# Patient Record
Sex: FEMALE | Race: OTHER | HISPANIC OR LATINO | URBAN - METROPOLITAN AREA
[De-identification: names, ages, dates, MRNs, and addresses within clinical notes are randomized per-mention and may not be internally consistent; named-entity substitution may affect disease eponyms.]

---

## 2017-08-20 ENCOUNTER — EMERGENCY (EMERGENCY)
Facility: HOSPITAL | Age: 27
LOS: 1 days | Discharge: HOME | End: 2017-08-20
Attending: EMERGENCY MEDICINE | Admitting: EMERGENCY MEDICINE
Payer: SELF-PAY

## 2017-08-20 VITALS
SYSTOLIC BLOOD PRESSURE: 105 MMHG | DIASTOLIC BLOOD PRESSURE: 67 MMHG | TEMPERATURE: 98 F | RESPIRATION RATE: 18 BRPM | OXYGEN SATURATION: 100 % | HEART RATE: 78 BPM

## 2017-08-20 VITALS
TEMPERATURE: 99 F | DIASTOLIC BLOOD PRESSURE: 54 MMHG | SYSTOLIC BLOOD PRESSURE: 95 MMHG | OXYGEN SATURATION: 100 % | HEART RATE: 82 BPM | RESPIRATION RATE: 18 BRPM | WEIGHT: 141.32 LBS

## 2017-08-20 DIAGNOSIS — R10.13 EPIGASTRIC PAIN: ICD-10-CM

## 2017-08-20 DIAGNOSIS — Z79.899 OTHER LONG TERM (CURRENT) DRUG THERAPY: ICD-10-CM

## 2017-08-20 DIAGNOSIS — J45.909 UNSPECIFIED ASTHMA, UNCOMPLICATED: ICD-10-CM

## 2017-08-20 LAB
ALBUMIN SERPL ELPH-MCNC: 3.9 G/DL — SIGNIFICANT CHANGE UP (ref 3.3–5)
ALP SERPL-CCNC: 38 U/L — LOW (ref 40–120)
ALT FLD-CCNC: 7 U/L — LOW (ref 10–45)
ANION GAP SERPL CALC-SCNC: 10 MMOL/L — SIGNIFICANT CHANGE UP (ref 5–17)
APPEARANCE UR: CLEAR — SIGNIFICANT CHANGE UP
AST SERPL-CCNC: 11 U/L — SIGNIFICANT CHANGE UP (ref 10–40)
BASOPHILS NFR BLD AUTO: 0.3 % — SIGNIFICANT CHANGE UP (ref 0–2)
BILIRUB SERPL-MCNC: 0.5 MG/DL — SIGNIFICANT CHANGE UP (ref 0.2–1.2)
BILIRUB UR-MCNC: NEGATIVE — SIGNIFICANT CHANGE UP
BUN SERPL-MCNC: 9 MG/DL — SIGNIFICANT CHANGE UP (ref 7–23)
CALCIUM SERPL-MCNC: 9.2 MG/DL — SIGNIFICANT CHANGE UP (ref 8.4–10.5)
CHLORIDE SERPL-SCNC: 105 MMOL/L — SIGNIFICANT CHANGE UP (ref 96–108)
CO2 SERPL-SCNC: 25 MMOL/L — SIGNIFICANT CHANGE UP (ref 22–31)
COLOR SPEC: YELLOW — SIGNIFICANT CHANGE UP
CREAT SERPL-MCNC: 0.7 MG/DL — SIGNIFICANT CHANGE UP (ref 0.5–1.3)
DIFF PNL FLD: NEGATIVE — SIGNIFICANT CHANGE UP
EOSINOPHIL NFR BLD AUTO: 0.3 % — SIGNIFICANT CHANGE UP (ref 0–6)
GLUCOSE SERPL-MCNC: 108 MG/DL — HIGH (ref 70–99)
GLUCOSE UR QL: NEGATIVE — SIGNIFICANT CHANGE UP
HCG UR QL: NEGATIVE — SIGNIFICANT CHANGE UP
HCT VFR BLD CALC: 37.2 % — SIGNIFICANT CHANGE UP (ref 34.5–45)
HGB BLD-MCNC: 12.3 G/DL — SIGNIFICANT CHANGE UP (ref 11.5–15.5)
KETONES UR-MCNC: NEGATIVE — SIGNIFICANT CHANGE UP
LEUKOCYTE ESTERASE UR-ACNC: NEGATIVE — SIGNIFICANT CHANGE UP
LIDOCAIN IGE QN: 15 U/L — SIGNIFICANT CHANGE UP (ref 7–60)
LYMPHOCYTES # BLD AUTO: 38 % — SIGNIFICANT CHANGE UP (ref 13–44)
MCHC RBC-ENTMCNC: 28.6 PG — SIGNIFICANT CHANGE UP (ref 27–34)
MCHC RBC-ENTMCNC: 33.1 G/DL — SIGNIFICANT CHANGE UP (ref 32–36)
MCV RBC AUTO: 86.5 FL — SIGNIFICANT CHANGE UP (ref 80–100)
MONOCYTES NFR BLD AUTO: 4.6 % — SIGNIFICANT CHANGE UP (ref 2–14)
NEUTROPHILS NFR BLD AUTO: 56.8 % — SIGNIFICANT CHANGE UP (ref 43–77)
NITRITE UR-MCNC: NEGATIVE — SIGNIFICANT CHANGE UP
PH UR: 5.5 — SIGNIFICANT CHANGE UP (ref 5–8)
PLATELET # BLD AUTO: 202 K/UL — SIGNIFICANT CHANGE UP (ref 150–400)
POTASSIUM SERPL-MCNC: 3.6 MMOL/L — SIGNIFICANT CHANGE UP (ref 3.5–5.3)
POTASSIUM SERPL-SCNC: 3.6 MMOL/L — SIGNIFICANT CHANGE UP (ref 3.5–5.3)
PROT SERPL-MCNC: 6.9 G/DL — SIGNIFICANT CHANGE UP (ref 6–8.3)
PROT UR-MCNC: NEGATIVE MG/DL — SIGNIFICANT CHANGE UP
RBC # BLD: 4.3 M/UL — SIGNIFICANT CHANGE UP (ref 3.8–5.2)
RBC # FLD: 13 % — SIGNIFICANT CHANGE UP (ref 10.3–16.9)
SODIUM SERPL-SCNC: 140 MMOL/L — SIGNIFICANT CHANGE UP (ref 135–145)
SP GR SPEC: >=1.03 — SIGNIFICANT CHANGE UP (ref 1–1.03)
UROBILINOGEN FLD QL: 0.2 E.U./DL — SIGNIFICANT CHANGE UP
WBC # BLD: 7 K/UL — SIGNIFICANT CHANGE UP (ref 3.8–10.5)
WBC # FLD AUTO: 7 K/UL — SIGNIFICANT CHANGE UP (ref 3.8–10.5)

## 2017-08-20 PROCEDURE — 93010 ELECTROCARDIOGRAM REPORT: CPT

## 2017-08-20 PROCEDURE — 96374 THER/PROPH/DIAG INJ IV PUSH: CPT

## 2017-08-20 PROCEDURE — 36415 COLL VENOUS BLD VENIPUNCTURE: CPT

## 2017-08-20 PROCEDURE — 83690 ASSAY OF LIPASE: CPT

## 2017-08-20 PROCEDURE — 81025 URINE PREGNANCY TEST: CPT

## 2017-08-20 PROCEDURE — 81003 URINALYSIS AUTO W/O SCOPE: CPT

## 2017-08-20 PROCEDURE — 76705 ECHO EXAM OF ABDOMEN: CPT

## 2017-08-20 PROCEDURE — 93005 ELECTROCARDIOGRAM TRACING: CPT

## 2017-08-20 PROCEDURE — 99285 EMERGENCY DEPT VISIT HI MDM: CPT | Mod: 25

## 2017-08-20 PROCEDURE — 85025 COMPLETE CBC W/AUTO DIFF WBC: CPT

## 2017-08-20 PROCEDURE — 76705 ECHO EXAM OF ABDOMEN: CPT | Mod: 26

## 2017-08-20 PROCEDURE — 99284 EMERGENCY DEPT VISIT MOD MDM: CPT | Mod: 25

## 2017-08-20 PROCEDURE — 80053 COMPREHEN METABOLIC PANEL: CPT

## 2017-08-20 RX ORDER — SODIUM CHLORIDE 9 MG/ML
1000 INJECTION INTRAMUSCULAR; INTRAVENOUS; SUBCUTANEOUS ONCE
Qty: 0 | Refills: 0 | Status: COMPLETED | OUTPATIENT
Start: 2017-08-20 | End: 2017-08-20

## 2017-08-20 RX ORDER — FAMOTIDINE 10 MG/ML
20 INJECTION INTRAVENOUS ONCE
Qty: 0 | Refills: 0 | Status: COMPLETED | OUTPATIENT
Start: 2017-08-20 | End: 2017-08-20

## 2017-08-20 RX ORDER — FAMOTIDINE 10 MG/ML
1 INJECTION INTRAVENOUS
Qty: 30 | Refills: 0 | OUTPATIENT
Start: 2017-08-20 | End: 2017-09-19

## 2017-08-20 RX ORDER — SODIUM CHLORIDE 9 MG/ML
3 INJECTION INTRAMUSCULAR; INTRAVENOUS; SUBCUTANEOUS ONCE
Qty: 0 | Refills: 0 | Status: COMPLETED | OUTPATIENT
Start: 2017-08-20 | End: 2017-08-20

## 2017-08-20 RX ORDER — LIDOCAINE 4 G/100G
10 CREAM TOPICAL ONCE
Qty: 0 | Refills: 0 | Status: COMPLETED | OUTPATIENT
Start: 2017-08-20 | End: 2017-08-20

## 2017-08-20 RX ADMIN — SODIUM CHLORIDE 1000 MILLILITER(S): 9 INJECTION INTRAMUSCULAR; INTRAVENOUS; SUBCUTANEOUS at 17:09

## 2017-08-20 RX ADMIN — LIDOCAINE 10 MILLILITER(S): 4 CREAM TOPICAL at 17:09

## 2017-08-20 RX ADMIN — Medication 30 MILLILITER(S): at 17:09

## 2017-08-20 RX ADMIN — FAMOTIDINE 20 MILLIGRAM(S): 10 INJECTION INTRAVENOUS at 17:09

## 2017-08-20 RX ADMIN — SODIUM CHLORIDE 3 MILLILITER(S): 9 INJECTION INTRAMUSCULAR; INTRAVENOUS; SUBCUTANEOUS at 17:06

## 2017-08-20 NOTE — ED PROVIDER NOTE - OBJECTIVE STATEMENT
here with abdominal pain for past week.  Epigastric, non radiating, dull.  Has not tried anything for symptoms.  Never experienced before.  Not associated with eating.  Denies n/v/d, fever/chills, urinary symptoms.  LMP 1 week ago

## 2017-08-20 NOTE — ED ADULT TRIAGE NOTE - CHIEF COMPLAINT QUOTE
upper abdominal pian for 1 week ; hard to take a deep breath; denies injury or other symptoms; no BCP

## 2017-08-20 NOTE — ED PROVIDER NOTE - MEDICAL DECISION MAKING DETAILS
epigastric pain.  suspect gastritis/possible ulcer.  lipase neg making pancreatitis unlikely.  lft wnl and ruq us wnl making biliary pathology unlikely.  no fever/ vomiting.  plan pepcid, outpatient f/u.  diet modification

## 2023-01-02 ENCOUNTER — EMERGENCY (EMERGENCY)
Facility: HOSPITAL | Age: 33
LOS: 0 days | Discharge: HOME | End: 2023-01-02
Attending: STUDENT IN AN ORGANIZED HEALTH CARE EDUCATION/TRAINING PROGRAM
Payer: MEDICAID

## 2023-01-02 VITALS
HEIGHT: 61 IN | RESPIRATION RATE: 18 BRPM | SYSTOLIC BLOOD PRESSURE: 104 MMHG | WEIGHT: 123.9 LBS | OXYGEN SATURATION: 99 % | DIASTOLIC BLOOD PRESSURE: 64 MMHG | TEMPERATURE: 99 F | HEART RATE: 86 BPM

## 2023-01-02 VITALS
SYSTOLIC BLOOD PRESSURE: 100 MMHG | HEART RATE: 80 BPM | TEMPERATURE: 98 F | OXYGEN SATURATION: 99 % | RESPIRATION RATE: 18 BRPM | DIASTOLIC BLOOD PRESSURE: 64 MMHG
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
## 2023-01-02 DIAGNOSIS — R07.89 OTHER CHEST PAIN: ICD-10-CM
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
## 2023-01-02 LAB
ALBUMIN SERPL ELPH-MCNC: 3.7 G/DL — SIGNIFICANT CHANGE UP (ref 3.3–5)
ALP SERPL-CCNC: 29 U/L — LOW (ref 40–120)
ALT FLD-CCNC: 17 U/L — SIGNIFICANT CHANGE UP (ref 12–78)
ANION GAP SERPL CALC-SCNC: 5 MMOL/L — SIGNIFICANT CHANGE UP (ref 5–17)
AST SERPL-CCNC: 10 U/L — LOW (ref 15–37)
BASOPHILS # BLD AUTO: 0.02 K/UL — SIGNIFICANT CHANGE UP (ref 0–0.2)
BASOPHILS NFR BLD AUTO: 0.2 % — SIGNIFICANT CHANGE UP (ref 0–2)
BILIRUB SERPL-MCNC: 0.4 MG/DL — SIGNIFICANT CHANGE UP (ref 0.2–1.2)
BUN SERPL-MCNC: 9 MG/DL — SIGNIFICANT CHANGE UP (ref 7–23)
CALCIUM SERPL-MCNC: 9.1 MG/DL — SIGNIFICANT CHANGE UP (ref 8.5–10.1)
CHLORIDE SERPL-SCNC: 108 MMOL/L — SIGNIFICANT CHANGE UP (ref 96–108)
CO2 SERPL-SCNC: 26 MMOL/L — SIGNIFICANT CHANGE UP (ref 22–31)
CREAT SERPL-MCNC: 0.71 MG/DL — SIGNIFICANT CHANGE UP (ref 0.5–1.3)
D DIMER BLD IA.RAPID-MCNC: 226 NG/ML DDU — SIGNIFICANT CHANGE UP
EGFR: 116 ML/MIN/1.73M2 — SIGNIFICANT CHANGE UP
EOSINOPHIL # BLD AUTO: 0 K/UL — SIGNIFICANT CHANGE UP (ref 0–0.5)
EOSINOPHIL NFR BLD AUTO: 0 % — SIGNIFICANT CHANGE UP (ref 0–6)
GLUCOSE SERPL-MCNC: 87 MG/DL — SIGNIFICANT CHANGE UP (ref 70–99)
HCG SERPL-ACNC: <1 MIU/ML — SIGNIFICANT CHANGE UP
HCT VFR BLD CALC: 39 % — SIGNIFICANT CHANGE UP (ref 34.5–45)
HGB BLD-MCNC: 12.7 G/DL — SIGNIFICANT CHANGE UP (ref 11.5–15.5)
IMM GRANULOCYTES NFR BLD AUTO: 0.3 % — SIGNIFICANT CHANGE UP (ref 0–0.9)
LYMPHOCYTES # BLD AUTO: 2.97 K/UL — SIGNIFICANT CHANGE UP (ref 1–3.3)
LYMPHOCYTES # BLD AUTO: 25.3 % — SIGNIFICANT CHANGE UP (ref 13–44)
MCHC RBC-ENTMCNC: 28.9 PG — SIGNIFICANT CHANGE UP (ref 27–34)
MCHC RBC-ENTMCNC: 32.6 G/DL — SIGNIFICANT CHANGE UP (ref 32–36)
MCV RBC AUTO: 88.8 FL — SIGNIFICANT CHANGE UP (ref 80–100)
MONOCYTES # BLD AUTO: 0.47 K/UL — SIGNIFICANT CHANGE UP (ref 0–0.9)
MONOCYTES NFR BLD AUTO: 4 % — SIGNIFICANT CHANGE UP (ref 2–14)
NEUTROPHILS # BLD AUTO: 8.25 K/UL — HIGH (ref 1.8–7.4)
NEUTROPHILS NFR BLD AUTO: 70.2 % — SIGNIFICANT CHANGE UP (ref 43–77)
NRBC # BLD: 0 /100 WBCS — SIGNIFICANT CHANGE UP (ref 0–0)
PLATELET # BLD AUTO: 236 K/UL — SIGNIFICANT CHANGE UP (ref 150–400)
POTASSIUM SERPL-MCNC: 3.8 MMOL/L — SIGNIFICANT CHANGE UP (ref 3.5–5.3)
POTASSIUM SERPL-SCNC: 3.8 MMOL/L — SIGNIFICANT CHANGE UP (ref 3.5–5.3)
PROT SERPL-MCNC: 6.8 GM/DL — SIGNIFICANT CHANGE UP (ref 6–8.3)
RBC # BLD: 4.39 M/UL — SIGNIFICANT CHANGE UP (ref 3.8–5.2)
RBC # FLD: 12.5 % — SIGNIFICANT CHANGE UP (ref 10.3–14.5)
SODIUM SERPL-SCNC: 139 MMOL/L — SIGNIFICANT CHANGE UP (ref 135–145)
TROPONIN I, HIGH SENSITIVITY RESULT: 11.7 NG/L — SIGNIFICANT CHANGE UP
WBC # BLD: 11.74 K/UL — HIGH (ref 3.8–10.5)
WBC # FLD AUTO: 11.74 K/UL — HIGH (ref 3.8–10.5)

## 2023-01-02 PROCEDURE — 99284 EMERGENCY DEPT VISIT MOD MDM: CPT

## 2023-01-02 PROCEDURE — 93010 ELECTROCARDIOGRAM REPORT: CPT

## 2023-01-02 PROCEDURE — 71046 X-RAY EXAM CHEST 2 VIEWS: CPT | Mod: 26

## 2023-01-02 RX ORDER — KETOROLAC TROMETHAMINE 30 MG/ML
30 SYRINGE (ML) INJECTION ONCE
Refills: 0 | Status: DISCONTINUED | OUTPATIENT
Start: 2023-01-02 | End: 2023-01-02

## 2023-01-02 RX ADMIN — Medication 30 MILLIGRAM(S): at 19:56

## 2023-01-02 NOTE — ED ADULT NURSE NOTE - OBJECTIVE STATEMENT
Pt AAOx4. 32 year old female presents to ED with L sided chest pain.  Pt reports having had similar symptoms on several occasions in the past, which had resolved, and had not seen doctor before for it.  Pt states when pain started today she was doing housework, cleaning, and lifting things when the pain started.  Pain worse with movement and inspiration.  Denies shortness of breath, abdominal pain, nausea/vomiting, fever, radiation of pain, diaphoresis. Pt thinks her birth control patch is causing pain. Respirations equal and unlabored. No acute distress noted at this time. Pt placed on cardiac monitor at bedside.

## 2023-01-02 NOTE — ED PROVIDER NOTE - PHYSICAL EXAMINATION
General appearance: Nontoxic appearing, conversant, afebrile    Eyes: anicteric sclerae, ISABEL, EOMI   HENT: Atraumatic; oropharynx clear, MMM and no ulcerations, no pharyngeal erythema or exudate   Neck: Trachea midline; Full range of motion, supple   Pulm: CTA bl, normal respiratory effort and no intercostal retractions, normal work of breathing   CV: RRR, No murmurs, rubs, or gallops, ttp L chest costochondral region   Abdomen: Soft, non-tender, non-distended; no guarding or rebound   Extremities: No peripheral edema, no gross deformities, FROM x4   Skin: Dry, normal temperature, turgor and texture; no rash   Psych: Appropriate affect, cooperative

## 2023-01-02 NOTE — ED PROVIDER NOTE - PATIENT PORTAL LINK FT
You can access the FollowMyHealth Patient Portal offered by [facility] by registering at the following website: [email or web address]. By joining [employer]’s FollowMyHealth portal, you will also be able to view your health information using other applications (apps) compatible with our system.

## 2023-01-02 NOTE — ED PROVIDER NOTE - NSFOLLOWUPINSTRUCTIONS_ED_ALL_ED_FT
Rest, drink plenty of fluids  Advance activity as tolerated  Continue all previously prescribed medications as directed  Follow up with your PMD - bring copies of your results  Return to the ER for severe chest pain, difficulty breathing, or other new or concerning symptoms

## 2023-01-02 NOTE — ED PROVIDER NOTE - OBJECTIVE STATEMENT
33yo female with no pmh presenting with L sided chest pain.  Has had similar on several occasions in the past which had self resolved and had not seen a doctor before for it.  Was doing house work and lifting things when pain started.  Pain worse with movement and inspiration.  Non exertional.  No abd pain, n/v, fever, cough, urinary symptoms.  No pshx.  Thought her ocps were causing it so she stopped taking.  No hx dvt/ pe.  Pain reproducible with palpation of L chest.

## 2023-01-02 NOTE — ED PROVIDER NOTE - CLINICAL SUMMARY MEDICAL DECISION MAKING FREE TEXT BOX
Presenting with L sided cp.  On and off over several weeks, acutely worse after lifting things today.  Not exertional - low suspicion acs.  Wells 0 and lower suspicion as not hypoxic, tachycardic, no edema or hx dvt/pe.  TTP over L chest wall costochondral joint with no rash, possible costochondritis.  No infectious symptoms.  No trauma.  Will XR, check labs, medicate, reassess.

## 2023-01-02 NOTE — ED PROVIDER NOTE - PROGRESS NOTE DETAILS
Pio: Symptomatically much improved.  Labs, XR, ecg, non actionable and reviewed results with patient.  Answered questions and will dc.

## 2023-05-09 NOTE — ED ADULT NURSE NOTE - PAIN: BODY LOCATION
upper/abdomen Protopic Pregnancy And Lactation Text: This medication is Pregnancy Category C. It is unknown if this medication is excreted in breast milk when applied topically.

## 2023-07-28 ENCOUNTER — EMERGENCY (EMERGENCY)
Facility: HOSPITAL | Age: 33
LOS: 0 days | Discharge: HOME | End: 2023-07-28
Attending: STUDENT IN AN ORGANIZED HEALTH CARE EDUCATION/TRAINING PROGRAM
Payer: MEDICAID

## 2023-07-28 VITALS
WEIGHT: 139.99 LBS | HEIGHT: 61 IN | OXYGEN SATURATION: 98 % | RESPIRATION RATE: 16 BRPM | HEART RATE: 90 BPM | SYSTOLIC BLOOD PRESSURE: 126 MMHG | TEMPERATURE: 100 F | DIASTOLIC BLOOD PRESSURE: 75 MMHG

## 2023-07-28 DIAGNOSIS — S61.011A LACERATION WITHOUT FOREIGN BODY OF RIGHT THUMB WITHOUT DAMAGE TO NAIL, INITIAL ENCOUNTER: ICD-10-CM
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
## 2023-07-28 PROCEDURE — 12001 RPR S/N/AX/GEN/TRNK 2.5CM/<: CPT

## 2023-07-28 PROCEDURE — 99283 EMERGENCY DEPT VISIT LOW MDM: CPT | Mod: 25

## 2023-07-28 NOTE — ED ADULT TRIAGE NOTE - BP NONINVASIVE DIASTOLIC (MM HG)
Procedure   Large Joint Arthrocentesis  Date/Time: 4/3/2018 3:16 PM  Consent given by: patient  Site marked: site marked  Timeout: Immediately prior to procedure a time out was called to verify the correct patient, procedure, equipment, support staff and site/side marked as required   Supporting Documentation  Indications: pain   Procedure Details  Location: knee - R knee  Preparation: Patient was prepped and draped in the usual sterile fashion  Needle size: 22 G  Approach: anterolateral  Medications administered: 3 mL bupivacaine 0.25 %; 3 mL lidocaine 1 %; 80 mg triamcinolone acetonide 40 MG/ML  Patient tolerance: patient tolerated the procedure well with no immediate complications            75
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
## 2023-07-28 NOTE — ED PROVIDER NOTE - CLINICAL SUMMARY MEDICAL DECISION MAKING FREE TEXT BOX
32 y/o F presenting to the ED w/ R thumb laceration.  Vitals stable.  Laceration is superficial. Patient does not want stitches. She would prefer to try dermabond.  Laceration repaired with dermabond, appears well closed.  Tetanus reportedly UTD.  Patient left prior to getting her discharge papers.

## 2023-07-28 NOTE — ED ADULT NURSE NOTE - AS PAIN REST
Airway patent, TM normal bilaterally, normal appearing mouth, nose, throat, neck supple with full range of motion, no cervical adenopathy. Moist mucous membrane.
2 (mild pain)

## 2023-07-28 NOTE — ED ADULT NURSE NOTE - NSFALLUNIVINTERV_ED_ALL_ED
Bed/Stretcher in lowest position, wheels locked, appropriate side rails in place/Call bell, personal items and telephone in reach/Instruct patient to call for assistance before getting out of bed/chair/stretcher/Non-slip footwear applied when patient is off stretcher/[place] to call system/Physically safe environment - no spills, clutter or unnecessary equipment/Purposeful proactive rounding/Room/bathroom lighting operational, light cord in reach

## 2023-07-28 NOTE — ED PROVIDER NOTE - PHYSICAL EXAMINATION
GENERAL: Awake, alert, NAD  HEENT: NC/AT, moist mucous membranes  LUNGS: CTAB, no wheezes or crackles   CARDIAC: RRR, no m/r/g  EXT: +2cm superficial laceration to ventral surface of R thumb, +neurovascularly intact  NEURO: A&Ox3. Moving all extremities.  SKIN: Warm and dry. No rash.  PSYCH: Normal affect.
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
## 2023-07-28 NOTE — ED PROVIDER NOTE - PATIENT PORTAL LINK FT
You can access the FollowMyHealth Patient Portal offered by [facility] by registering at the following website: [email or web address]. By joining [employer]’s FollowMyHealth portal, you will also be able to view your health information using other applications (apps) compatible with our system.

## 2023-07-28 NOTE — ED ADULT NURSE NOTE - OBJECTIVE STATEMENT
33 year old patient has laceration to R-thumb s/p cuff with a straight pairing knife.  approx 2cm long, deep.  up to date on tetanus, pt doesn't want to get it sticked, not bleeding at this time, no other complaints, "just want to go home", doctor maureen will continue to Dermabond the laceration at this time

## 2024-10-09 ENCOUNTER — EMERGENCY (EMERGENCY)
Facility: HOSPITAL | Age: 34
LOS: 0 days | Discharge: LEFT BEFORE DISCHARGE | End: 2024-10-09
Attending: STUDENT IN AN ORGANIZED HEALTH CARE EDUCATION/TRAINING PROGRAM
Payer: MEDICAID

## 2024-10-09 VITALS
HEIGHT: 61 IN | OXYGEN SATURATION: 99 % | SYSTOLIC BLOOD PRESSURE: 103 MMHG | TEMPERATURE: 98 F | WEIGHT: 154.98 LBS | RESPIRATION RATE: 18 BRPM | DIASTOLIC BLOOD PRESSURE: 72 MMHG | HEART RATE: 62 BPM

## 2024-10-09 VITALS
SYSTOLIC BLOOD PRESSURE: 120 MMHG | TEMPERATURE: 98 F | HEART RATE: 57 BPM | RESPIRATION RATE: 20 BRPM | OXYGEN SATURATION: 99 % | DIASTOLIC BLOOD PRESSURE: 82 MMHG

## 2024-10-09 DIAGNOSIS — R10.32 LEFT LOWER QUADRANT PAIN: ICD-10-CM

## 2024-10-09 DIAGNOSIS — R11.2 NAUSEA WITH VOMITING, UNSPECIFIED: ICD-10-CM

## 2024-10-09 DIAGNOSIS — R10.33 PERIUMBILICAL PAIN: ICD-10-CM

## 2024-10-09 LAB
ALBUMIN SERPL ELPH-MCNC: 3.9 G/DL — SIGNIFICANT CHANGE UP (ref 3.3–5)
ALP SERPL-CCNC: 55 U/L — SIGNIFICANT CHANGE UP (ref 40–120)
ALT FLD-CCNC: 23 U/L — SIGNIFICANT CHANGE UP (ref 12–78)
ANION GAP SERPL CALC-SCNC: 11 MMOL/L — SIGNIFICANT CHANGE UP (ref 5–17)
APPEARANCE UR: CLEAR — SIGNIFICANT CHANGE UP
AST SERPL-CCNC: 13 U/L — LOW (ref 15–37)
BACTERIA # UR AUTO: ABNORMAL /HPF
BASOPHILS # BLD AUTO: 0.02 K/UL — SIGNIFICANT CHANGE UP (ref 0–0.2)
BASOPHILS NFR BLD AUTO: 0.2 % — SIGNIFICANT CHANGE UP (ref 0–2)
BILIRUB SERPL-MCNC: 0.3 MG/DL — SIGNIFICANT CHANGE UP (ref 0.2–1.2)
BILIRUB UR-MCNC: NEGATIVE — SIGNIFICANT CHANGE UP
BUN SERPL-MCNC: 9 MG/DL — SIGNIFICANT CHANGE UP (ref 7–23)
CALCIUM SERPL-MCNC: 9.3 MG/DL — SIGNIFICANT CHANGE UP (ref 8.5–10.1)
CHLORIDE SERPL-SCNC: 106 MMOL/L — SIGNIFICANT CHANGE UP (ref 96–108)
CO2 SERPL-SCNC: 24 MMOL/L — SIGNIFICANT CHANGE UP (ref 22–31)
COLOR SPEC: YELLOW — SIGNIFICANT CHANGE UP
CREAT SERPL-MCNC: 0.62 MG/DL — SIGNIFICANT CHANGE UP (ref 0.5–1.3)
DIFF PNL FLD: NEGATIVE — SIGNIFICANT CHANGE UP
EGFR: 120 ML/MIN/1.73M2 — SIGNIFICANT CHANGE UP
EOSINOPHIL # BLD AUTO: 0.01 K/UL — SIGNIFICANT CHANGE UP (ref 0–0.5)
EOSINOPHIL NFR BLD AUTO: 0.1 % — SIGNIFICANT CHANGE UP (ref 0–6)
EPI CELLS # UR: PRESENT
GLUCOSE SERPL-MCNC: 91 MG/DL — SIGNIFICANT CHANGE UP (ref 70–99)
GLUCOSE UR QL: NEGATIVE MG/DL — SIGNIFICANT CHANGE UP
HCG SERPL-ACNC: <1 MIU/ML — SIGNIFICANT CHANGE UP
HCT VFR BLD CALC: 43.3 % — SIGNIFICANT CHANGE UP (ref 34.5–45)
HGB BLD-MCNC: 14.3 G/DL — SIGNIFICANT CHANGE UP (ref 11.5–15.5)
IMM GRANULOCYTES NFR BLD AUTO: 0.2 % — SIGNIFICANT CHANGE UP (ref 0–0.9)
KETONES UR-MCNC: ABNORMAL MG/DL
LEUKOCYTE ESTERASE UR-ACNC: NEGATIVE — SIGNIFICANT CHANGE UP
LIDOCAIN IGE QN: 13 U/L — SIGNIFICANT CHANGE UP (ref 13–75)
LYMPHOCYTES # BLD AUTO: 1.98 K/UL — SIGNIFICANT CHANGE UP (ref 1–3.3)
LYMPHOCYTES # BLD AUTO: 20.6 % — SIGNIFICANT CHANGE UP (ref 13–44)
MCHC RBC-ENTMCNC: 28.6 PG — SIGNIFICANT CHANGE UP (ref 27–34)
MCHC RBC-ENTMCNC: 33 G/DL — SIGNIFICANT CHANGE UP (ref 32–36)
MCV RBC AUTO: 86.6 FL — SIGNIFICANT CHANGE UP (ref 80–100)
MONOCYTES # BLD AUTO: 0.25 K/UL — SIGNIFICANT CHANGE UP (ref 0–0.9)
MONOCYTES NFR BLD AUTO: 2.6 % — SIGNIFICANT CHANGE UP (ref 2–14)
NEUTROPHILS # BLD AUTO: 7.33 K/UL — SIGNIFICANT CHANGE UP (ref 1.8–7.4)
NEUTROPHILS NFR BLD AUTO: 76.3 % — SIGNIFICANT CHANGE UP (ref 43–77)
NITRITE UR-MCNC: NEGATIVE — SIGNIFICANT CHANGE UP
NRBC # BLD: 0 /100 WBCS — SIGNIFICANT CHANGE UP (ref 0–0)
PH UR: 7 — SIGNIFICANT CHANGE UP (ref 5–8)
PLATELET # BLD AUTO: 298 K/UL — SIGNIFICANT CHANGE UP (ref 150–400)
POTASSIUM SERPL-MCNC: 3.8 MMOL/L — SIGNIFICANT CHANGE UP (ref 3.5–5.3)
POTASSIUM SERPL-SCNC: 3.8 MMOL/L — SIGNIFICANT CHANGE UP (ref 3.5–5.3)
PROT SERPL-MCNC: 7.9 GM/DL — SIGNIFICANT CHANGE UP (ref 6–8.3)
PROT UR-MCNC: 30 MG/DL
RBC # BLD: 5 M/UL — SIGNIFICANT CHANGE UP (ref 3.8–5.2)
RBC # FLD: 12.7 % — SIGNIFICANT CHANGE UP (ref 10.3–14.5)
RBC CASTS # UR COMP ASSIST: 0 /HPF — SIGNIFICANT CHANGE UP (ref 0–4)
SODIUM SERPL-SCNC: 141 MMOL/L — SIGNIFICANT CHANGE UP (ref 135–145)
SP GR SPEC: 1.03 — SIGNIFICANT CHANGE UP (ref 1–1.03)
UROBILINOGEN FLD QL: 1 MG/DL — SIGNIFICANT CHANGE UP (ref 0.2–1)
WBC # BLD: 9.61 K/UL — SIGNIFICANT CHANGE UP (ref 3.8–10.5)
WBC # FLD AUTO: 9.61 K/UL — SIGNIFICANT CHANGE UP (ref 3.8–10.5)
WBC UR QL: 2 /HPF — SIGNIFICANT CHANGE UP (ref 0–5)

## 2024-10-09 PROCEDURE — 74177 CT ABD & PELVIS W/CONTRAST: CPT | Mod: 26,MC

## 2024-10-09 PROCEDURE — 99285 EMERGENCY DEPT VISIT HI MDM: CPT

## 2024-10-09 RX ORDER — ONDANSETRON HCL/PF 4 MG/2 ML
4 VIAL (ML) INJECTION ONCE
Refills: 0 | Status: COMPLETED | OUTPATIENT
Start: 2024-10-09 | End: 2024-10-09

## 2024-10-09 RX ORDER — SODIUM CHLORIDE 0.9 % (FLUSH) 0.9 %
1000 SYRINGE (ML) INJECTION ONCE
Refills: 0 | Status: COMPLETED | OUTPATIENT
Start: 2024-10-09 | End: 2024-10-09

## 2024-10-09 RX ORDER — FAMOTIDINE 40 MG
20 TABLET ORAL ONCE
Refills: 0 | Status: COMPLETED | OUTPATIENT
Start: 2024-10-09 | End: 2024-10-09

## 2024-10-09 RX ADMIN — Medication 1000 MILLILITER(S): at 16:44

## 2024-10-09 RX ADMIN — Medication 20 MILLIGRAM(S): at 16:45

## 2024-10-09 RX ADMIN — Medication 4 MILLIGRAM(S): at 16:45

## 2024-10-09 NOTE — ED PROVIDER NOTE - CLINICAL SUMMARY MEDICAL DECISION MAKING FREE TEXT BOX
33 y/o F with no significant PMH presenting to the ED w/ N/V.   Vitals stable.  Patient is well appearing in NAD.  She has mild periumbilical and LLQ tenderness.  Plan for labs and CT AP w/ IV contrast  Pain control

## 2024-10-09 NOTE — ED PROVIDER NOTE - OBJECTIVE STATEMENT
35 y/o F with no significant PMH presenting to the ED w/ N/V. Patient was drinking tequila yesterday. States today she began to have multiple episodes of vomiting. She is endorsing some LLQ pain. She has no fever or chills. No diarrhea. No chest pain or dyspnea. Vomiting stopped when she arrived to the ED.

## 2024-10-09 NOTE — ED PROVIDER NOTE - CARE PROVIDER_API CALL
Gagan Lutz  Gastroenterology  [address], [address]  [place] [address]  Phone: [phone]  Fax: [phone]  Follow Up Time: 1-3 Days

## 2024-10-09 NOTE — ED PROVIDER NOTE - PATIENT PORTAL LINK FT
You can access the FollowMyHealth Patient Portal offered by [facility] by registering at the following website: [email or web address]. By joining [employer]’s FollowMyHealth portal, you will also be able to view your health information using other applications (apps) compatible with our system.

## 2024-10-09 NOTE — ED ADULT TRIAGE NOTE - CHIEF COMPLAINT QUOTE
biba from home c/o n/v dizziness this morning after drinking 3 shots of tequila last night denies daily/frequent alcohol use ew=[id number] per ems

## 2024-10-09 NOTE — ED ADULT TRIAGE NOTE - TEMPERATURE IN CELSIUS (DEGREES C)
Post-Op Assessment Note    CV Status:  Stable    Pain management: adequate       Mental Status:  Alert and awake   Hydration Status:  Euvolemic   PONV Controlled:  Controlled   Airway Patency:  Patent     Post Op Vitals Reviewed: Yes    No anethesia notable event occurred.    Staff: Anesthesiologist               BP   150/81   Temp   98.1   Pulse 61   Resp 12   SpO2 97     
36.7

## 2024-10-09 NOTE — ED PROVIDER NOTE - PROGRESS NOTE DETAILS
Pt was seen and treated by Dr. Shahid ct abd/pelvis is pending, Pt has normal wbc, electrolytes bun/cr, lipase and lft. Pt is alert and oriented x 3 tolerated water orally abd is soft nontender to palp at all quadrants. Pt is smiling appears very comfortable and feeling much better now. ct abd/pelvis official report still not back and pt needs to go home because her  and pt sts she has no more abd pain, Pt is explained the risk of leaving hospital without ct abd/pelvis reports and pt sts she will come back if pain reoccurred. radiologist Dr. Wen  just called back sts ct abd/pelvis showed salpingitis  and pt needs to come back for us of pelvis. radiologist Dr. Wen  just called back sts ct abd/pelvis showed bilateral hyddrosalpinx or bowel loops  and pt needs to come back for us of pelvis. According to [facility] medical record shows [phone] and [phone] are called and left message to come back for sono of pelvis on both numbers. pt called back and notified about ct abd/pelvis report and agreed to come back for sono of pelvis.

## 2024-10-09 NOTE — ED ADULT NURSE NOTE - CHIEF COMPLAINT QUOTE
biba from home c/o n/v dizziness this morning after drinking 3 shots of tequila last night denies daily/frequent alcohol use un=[id number] per ems

## 2024-10-09 NOTE — ED ADULT TRIAGE NOTE - ESI TRIAGE ACUITY LEVEL, MLM
3 4 = completely dependent Rotation Flap Text: The defect edges were debeveled with a #15 scalpel blade.  Given the location of the defect, shape of the defect and the proximity to free margins a rotation flap was deemed most appropriate.  Using a sterile surgical marker, an appropriate rotation flap was drawn incorporating the defect and placing the expected incisions within the relaxed skin tension lines where possible.    The area thus outlined was incised deep to adipose tissue with a #15 scalpel blade.  The skin margins were undermined to an appropriate distance in all directions utilizing iris scissors.

## 2024-10-09 NOTE — ED PROVIDER NOTE - PHYSICAL EXAMINATION
GENERAL: Awake, alert, NAD  HEENT: NC/AT, moist mucous membranes  LUNGS: CTAB, no wheezes or crackles   CARDIAC: RRR, no m/r/g  ABDOMEN: Soft, mild periumbilical and LLQ tenderness, non distended, no rebound, no guarding  BACK: No midline spinal tenderness, no CVA tenderness  EXT: No edema, no calf tenderness,  no deformities.  NEURO: A&Ox3. Moving all extremities.  SKIN: Warm and dry. No rash.  PSYCH: Normal affect.

## 2024-10-09 NOTE — ED ADULT NURSE NOTE - OBJECTIVE STATEMENT
34 year old female A/Ox4 c/o nausea and vomiting after drinking yesterday, pt reports to drinking 3 shots yesterday, denies drinking everyday, pt reports she drinks socially and rarely, pt c/o abdominal discomfort, pt ambulatory with steady gait

## 2024-10-10 ENCOUNTER — EMERGENCY (EMERGENCY)
Facility: HOSPITAL | Age: 34
LOS: 0 days | Discharge: HOME | End: 2024-10-10
Attending: EMERGENCY MEDICINE
Payer: MEDICAID

## 2024-10-10 VITALS
WEIGHT: 160.06 LBS | TEMPERATURE: 99 F | OXYGEN SATURATION: 98 % | RESPIRATION RATE: 18 BRPM | HEIGHT: 61 IN | DIASTOLIC BLOOD PRESSURE: 73 MMHG | HEART RATE: 81 BPM | SYSTOLIC BLOOD PRESSURE: 113 MMHG

## 2024-10-10 DIAGNOSIS — Z01.89 ENCOUNTER FOR OTHER SPECIFIED SPECIAL EXAMINATIONS: ICD-10-CM

## 2024-10-10 LAB
APPEARANCE UR: CLEAR — SIGNIFICANT CHANGE UP
BACTERIA # UR AUTO: ABNORMAL /HPF
BILIRUB UR-MCNC: NEGATIVE — SIGNIFICANT CHANGE UP
COLOR SPEC: YELLOW — SIGNIFICANT CHANGE UP
DIFF PNL FLD: NEGATIVE — SIGNIFICANT CHANGE UP
EPI CELLS # UR: SIGNIFICANT CHANGE UP
GLUCOSE UR QL: NEGATIVE MG/DL — SIGNIFICANT CHANGE UP
GRAN CASTS # UR COMP ASSIST: SIGNIFICANT CHANGE UP
HCG UR QL: NEGATIVE — SIGNIFICANT CHANGE UP
KETONES UR-MCNC: ABNORMAL MG/DL
LEUKOCYTE ESTERASE UR-ACNC: NEGATIVE — SIGNIFICANT CHANGE UP
NITRITE UR-MCNC: NEGATIVE — SIGNIFICANT CHANGE UP
PH UR: 5.5 — SIGNIFICANT CHANGE UP (ref 5–8)
PROT UR-MCNC: 30 MG/DL
RBC CASTS # UR COMP ASSIST: NEGATIVE /HPF — SIGNIFICANT CHANGE UP (ref 0–4)
SP GR SPEC: >1.03 — HIGH (ref 1–1.03)
UROBILINOGEN FLD QL: 0.2 MG/DL — SIGNIFICANT CHANGE UP (ref 0.2–1)
WBC UR QL: SIGNIFICANT CHANGE UP /HPF (ref 0–5)

## 2024-10-10 PROCEDURE — 99284 EMERGENCY DEPT VISIT MOD MDM: CPT

## 2024-10-10 PROCEDURE — 76856 US EXAM PELVIC COMPLETE: CPT | Mod: 26

## 2024-10-10 NOTE — ED ADULT NURSE NOTE - OBJECTIVE STATEMENT
patient alert and oriented x4, came in for abdominal pain. pt states she started having abdominal pain 2x days ago associated with nausea, vomiting, pt was seen here yesterday where CT and blood work completed, pt was called to return to the ER due to abnormal CT results and needed an US. pt denies nausea, vomiting, chest pain, SOB, dizziness at this time. denies pmh. pt OOB independent with steady gait. pt in no acute respiratory distress or discomfort at this time. fall precautions maintained. safety precautions maintained.

## 2024-10-10 NOTE — ED PROVIDER NOTE - PHYSICAL EXAMINATION
On Physical Exam:  General: well appearing, in NAD, speaking clearly in full sentences and without difficulty; cooperative with exam  HEENT: anicteric sclera, airway patent  Abdomen: soft nontender/nondistended  : no bladder tenderness or distension  Skin: intact, no rash  Extremities: no peripheral edema, no gross deformities

## 2024-10-10 NOTE — ED PROVIDER NOTE - PROGRESS NOTE DETAILS
Attending note (Rafael): Patient remains hemodynamically stable no pain.  Upset with wait for ultrasound.  Called and spoke with ultrasound tech when patient currently being scanned in the this patient will be next.  Explained this to patient and explained that she has to leave within the next hour to go  daughter.  Apologized for wait but explained that sometimes emergent cases will occur and we do our best to get every month testing as quickly and safely as possible.  Patient agreed to wait for the now. Attending note (Rafael): Escalated multiple times able to have ultrasound performed the patient declined to wait for ultrasound results and assess to go  her daughter.  Will discharge and follow-up results given she is well-appearing afebrile and has no palmar pain.

## 2024-10-10 NOTE — ED PROVIDER NOTE - CLINICAL SUMMARY MEDICAL DECISION MAKING FREE TEXT BOX
Attending note (Rafael): 34-year-old female with no reported medical comorbidities presenting to ED for evaluation of abnormal CT obtained during ED visit yesterday.  Patient presenting for evaluation of dehydration and abdominal pain states she feels much better now and has no abdominal pain however was called and told to come back as she was found to have possible swelling in her fallopian tubes.  Denies any pelvic pain or vaginal bleeding or discharge.  Per review of EMR CT imaging from yesterday revealed evidence of possible hydrosalpinx versus unopacified small bowel noted in bilateral pelvis.   No history of STI reported.  reports she completed menstruating 2 days ago.  On exam, no tenderness, well appearing; will obtain US of pelvic organs to evaluate for hydrosalpynx/hematolapinx but given asymptomatic, unlikely to require additional evaluation emergently, and recommend f/u with her obgyn as outpatient pending review of US results. Attending note (Rafael): 34-year-old female with no reported medical comorbidities presenting to ED for evaluation of abnormal CT obtained during ED visit yesterday.  Patient presenting for evaluation of dehydration and abdominal pain states she feels much better now and has no abdominal pain however was called and told to come back as she was found to have possible swelling in her fallopian tubes.  Denies any pelvic pain or vaginal bleeding or discharge.  Per review of EMR CT imaging from yesterday revealed evidence of possible hydrosalpinx versus unopacified small bowel noted in bilateral pelvis.   No history of STI reported.  reports she completed menstruating 2 days ago.  On exam, no tenderness, well appearing; will obtain US of pelvic organs to evaluate for hydrosalpinx / hematosalpinx but given asymptomatic, unlikely to require additional evaluation emergently, and recommend f/u with her obgyn as outpatient pending review of US results.

## 2024-10-10 NOTE — ED ADULT NURSE NOTE - NSFALLUNIVINTERV_ED_ALL_ED
Bed/Stretcher in lowest position, wheels locked, appropriate side rails in place/Call bell, personal items and telephone in reach/Instruct patient to call for assistance before getting out of bed/chair/stretcher/Non-slip footwear applied when patient is off stretcher/[place] to call system/Physically safe environment - no spills, clutter or unnecessary equipment/Purposeful proactive rounding/Room/bathroom lighting operational, light cord in reach

## 2024-10-10 NOTE — ED PROVIDER NOTE - CARE PROVIDER_API CALL
Amber Croft  Obstetrics and Gynecology  [address], [address]  [place] [address]  Phone: [phone]  Fax: [phone]  Follow Up Time:

## 2024-10-10 NOTE — ED ADULT TRIAGE NOTE - CHIEF COMPLAINT QUOTE
pt was seen yesterday for abdominal pain, had ct done. called back to the ED by DR HAY for abnormal CT. states she was told she needs a ultrasound. c/o LLQ abdominal pain. LMP 10/05. no history

## 2024-10-10 NOTE — ED PROVIDER NOTE - OBJECTIVE STATEMENT
Attending note (Rafael): 34-year-old female with no reported medical comorbidities presenting to ED for evaluation of abnormal CT obtained during ED visit yesterday.  Patient presenting for evaluation of dehydration and abdominal pain states she feels much better now and has no abdominal pain however was called and told to come back as she was found to have possible swelling in her fallopian tubes.  Denies any pelvic pain or vaginal bleeding or discharge.  Per review of EMR CT imaging from yesterday revealed evidence of possible hydrosalpinx versus unopacified small bowel noted in bilateral pelvis.   No history of STI reported.  reports she completed menstruating 2 days ago.

## 2024-10-10 NOTE — ED PROVIDER NOTE - NSFOLLOWUPINSTRUCTIONS_ED_ALL_ED_FT
You were evaluated in the Emergency Department for a follow up ultrasound.  You were evaluated and examined by a physician, and you had a pelvic ultrasound; results are not yet available when you left the emergency department.    We recommend that you:  1. See your OBGYN within the next 72 hours for follow up.  Bring a copy of your discharge paperwork (including any test results) to your doctor.        *** Return immediately if you have any other new/concerning symptoms. ***

## 2024-10-10 NOTE — ED PROVIDER NOTE - PATIENT PORTAL LINK FT
You can access the FollowMyHealth Patient Portal offered by [facility] by registering at the following website: [email or web address]. By joining [employer]’s FollowMyHealth portal, you will also be able to view your health information using other applications (apps) compatible with our system.

## 2024-10-10 NOTE — ED ADULT NURSE NOTE - CHPI ED NUR SYMPTOMS NEG
PAST MEDICAL HISTORY:  Arthritis back pain    Arthritis     DM (diabetes mellitus)     ED (erectile dysfunction)     GERD (gastroesophageal reflux disease)     HTN (hypertension)     Hypercholesteremia     Micturition frequency     Snores      no abdominal distension/no blood in stool/no burning urination/no chills/no diarrhea/no dysuria/no fever/no hematuria/no nausea/no vomiting

## 2024-10-11 LAB
CULTURE RESULTS: SIGNIFICANT CHANGE UP
SPECIMEN SOURCE: SIGNIFICANT CHANGE UP

## 2024-10-11 NOTE — ED POST DISCHARGE NOTE - RESULT SUMMARY
had left prior to results of US; attempted to call and give results (normal pelvic US) left message.

## 2024-11-22 NOTE — ED ADULT NURSE NOTE - NSFALLRSKHARMRISK_ED_ALL_ED
.[facility] Hospitalist Progress Note      Date Of Service: 11/22/2024    Reason for F/U: DVT and PE    Subjective:  Patient is seen and examined.    states that his right leg hurts a lot today and is less swollen.  Denies any chest pain or shortness of breath.      ROS:   Negative for all 10 systems except as per subjective      II/O's    Intake/Output Summary (Last 24 hours) at 11/22/2024 0937  Last data filed at 11/22/2024 0526  Gross per 24 hour   Intake 469.44 ml   Output --   Net 469.44 ml          Hospital Meds  Current Facility-Administered Medications   Medication Dose Route Frequency Provider Last Rate Last Admin    sodium chloride 0.9 % injection 10 mL  10 mL Intravenous PRN Stella Cade CNP        sodium chloride 0.9 % injection 2 mL  2 mL Intracatheter 2 times per day Stella Cade CNP   2 mL at 11/22/24 0147    sodium chloride (NORMAL SALINE) 0.9 % bolus 500 mL  500 mL Intravenous PRN Stella Cade CNP        acetaminophen (TYLENOL) tablet 650 mg  650 mg Oral Q4H PRN Stella Cade CNP        Or    acetaminophen (TYLENOL) suppository 650 mg  650 mg Rectal Q4H PRN Stella Cade CNP        docusate sodium-sennosides (SENOKOT S) 50-8.6 MG 2 tablet  2 tablet Oral BID PRN Stella Cade CNP        dextrose 50 % injection 25 g  25 g Intravenous PRN Stella Cade CNP        dextrose 50 % injection 12.5 g  12.5 g Intravenous PRN Stella Cade CNP        insulin lispro (ADMELOG,HumaLOG) - Correction Dose   Subcutaneous 4x Daily AC & HS Stella Cade CNP   3 Units at 11/22/24 0924    hydrALAZINE (APRESOLINE) tablet 25 mg  25 mg Oral Q4H PRN Ector Danielle MD   25 mg at 11/22/24 0630    heparin (porcine) 25,000 units/250 mL in dextrose 5 % infusion  1-40 Units/kg/hr (Order-Specific) Intravenous Continuous Po Israel MD 15.6 mL/hr at 11/22/24 0746 15 Units/kg/hr at 11/22/24 0746    heparin (porcine) injection 8,300 Units  80 Units/kg (Order-Specific)  Intravenous PRN Po Israel MD        heparin (porcine) injection 4,100 Units  40 Units/kg (Order-Specific) Intravenous PRN Po Israel MD        insulin glargine (LANTUS) injection 10 Units  10 Units Subcutaneous Nightly Okorie, Stella E, CNP   10 Units at 11/22/24 0147    dextrose 50 % injection 25 g  25 g Intravenous PRN Okorie, Stella E, CNP        dextrose 50 % injection 12.5 g  12.5 g Intravenous PRN Okorie, Stella E, CNP        glucagon (GLUCAGEN) injection 1 mg  1 mg Intramuscular PRN Okorie, Stella E, CNP        dextrose (GLUTOSE) 40 % gel 15 g  15 g Oral PRN Okorie, Stella E, CNP        dextrose (GLUTOSE) 40 % gel 30 g  30 g Oral PRN Okorie, Stella E, CNP            Last Recorded Vitals  Temp:  [97.9 °F (36.6 °C)-99.4 °F (37.4 °C)] 97.9 °F (36.6 °C)  Heart Rate:  [] 99  Resp:  [16-18] 18  BP: (159-174)/() 159/95       SpO2 Readings from Last 3 Encounters:   11/22/24 94%   01/18/24 98%   03/17/22 97%        Physical Exam    General: Alert in no acute distress   Head and Neck: Normocephalic, atraumatic. Conjunctivae clear,  hearing is normal.   Neck supple.   Respiratory: Lungs clear to auscultation bilaterally, no wheezing. Normal respiratory effort.   Cardiovascular: Regular rate and rhythm, no murmurs, rubs, or gallops  Gastrointestinal: Abdomen is soft nontender, nondistended,  no palpable masses. BS normal  Neuro: Cranial nerves grossly intact,  no focal deficit. Speech is normal. Moves all extremities.   Skin: No rash, no jaundice.  Extremities: No swelling, No calf tenderness.  Psych: Appropriate mood and affect.     Labs     Recent Labs     11/21/24  2110 11/22/24  0000 11/22/24  0018 11/22/24  0600 11/22/24  0806   WBC 8.0 8.5  --  8.8  --    HCT 34.5* 33.1*  --  35.5*  --    HGB 11.0* 10.7*  --  11.3*  --     195  --  210  --    SODIUM 137  --   --  137  --    POTASSIUM 4.4  --   --  3.7  --    CHLORIDE 106  --   --  108  --    CO2 24  --   --  22  --     GLUCOSE 336*  --   --  255*  --    BUN 26*  --   --  19  --    CREATININE 1.10  --   --  0.93  --    CALCIUM 8.8  --   --  8.8  --    ALBUMIN 3.2*  --   --  3.1*  --    BILIRUBIN 0.5  --   --  0.6  --    ALKPT 90  --   --  84  --    AST 9  --   --  <8  --    GPT 16  --   --  11  --    GLUB  --   --  270*  --  273*        Imaging    CTA CHEST PULMONARY EMBOLISM   Final Result      1.   Bilateral main pulmonary artery emboli with a nonocclusive saddle   component as demonstrated on image 105 of series 3 extending from the left   main pulmonary artery to the proximal right main pulmonary artery. RV LV   ratio is not dilated to suggest víctor heart strain.   2.   1.0 cm nodule within the posterior segment of the left lower lung.      Emergent findings were discussed with Dr. Mccoy at the time of this   dictation.      Electronically Signed by: CARMEN CLIFTON MD    Signed on: 11/21/2024 11:06 PM    Workstation ID: [id number]      US VAS LOWER EXTREMITY VENOUS DUPLEX RIGHT   Final Result   1.   Acute deep vein thrombosis of the right lower extremity, from the mid   femoral vein through the calf veins, described above..         Findings of the acute deep venous thrombosis were conveyed via telephone by   Dr. Brar (Radiologist) and acknowledged by Dr. Po Israel at   2013 on 11/21/2024.         Electronically Signed by: DOMENICA BRAR M.D.    Signed on: 11/21/2024 8:15 PM    Workstation ID: [id number]      XR TIBIA FIBULA 2 VIEWS RIGHT   Final Result   No acute osseous abnormality of the right tibia-fibula.      Electronically Signed by: DOMENICA BRAR M.D.    Signed on: 11/21/2024 7:33 PM    Workstation ID: [id number]          Cultures  Microbiology Results       None             Assessment & Plan     #Right femoral DVT, acute  # Previous history of DVT 2 years ago  XR TIBIA FIBULA 2 VIEWS RIGHT - No acute osseous abnormality of the right tibia-fibula.  US VAS LOWER EXTREMITY VENOUS DUPLEX RIGHT -  Acute deep vein thrombosis of the right lower extremity, from the mid femoral vein through the calf veins, described above.             EKG - Sinus tachycardia. Cannot rule out. Inferior infarct, age undetermined  Abnormal ECG.No previous ECGs available.  Rate 103, QTc 463  INR 1.0             On Heparin gtt  Hematology consult appreciated, d/w Dr Grewal              consult for resources-patient does not have insurance currently and unable to afford Eliquis.  Will need to start taking Coumadin.  Will arrange clinic for follow-up.             Continue neurovascular checks             Monitor              Monitor labs     Bilateral  pulmonary embolism  CTA chest - Bilateral main pulmonary artery emboli with a nonocclusive saddle  component as demonstrated on image 105 of series 3 extending from the left main pulmonary artery to the proximal right main pulmonary artery. RV LV ratio is not dilated to suggest víctor heart strain.             EKG as above             Started on heparin drip             Discussed with hematology             Monitor on telemetry             Pending echocardiogram             Monitor O2 sats  Consulted cardiology service for possible thrombectomy.     Lung nodule  CT Chest -  1.0 cm nodule within the posterior segment of the left lower lung.             Monitor      Hypoalbuminemia             Albumin 3.2, encourage high-protein diet     Anemia -likely anemia of chronic disease             Hemoglobin 11.0             Unknown recent baseline    Diabetes type 2, uncontrolled with hyperglycemia             Blood glucose on arrival 336             Patient not on any prescription medication at home             Started on Lantus 10 units, increase to 18 units tonight        A1c is 12.5%             Monitor on sliding scale-increase dose for sliding scale               Nutrition: Regular, diabetic  DVT Ppx: Heparin drip  Dispo: Home  SARA: When INR therapeutic    Code Status    Code  Status: Full Resuscitation    Sheba Bobby MD  [facility]    This note was created using the Dragon voice recognition system. Errors in content may be related to improper recognition of the system. Effort to review and correct the note has been made but irregularities may still be present.     Note to the patient:  the 21st Century Cures Act makes medical notes like these available to patients in the interest of transparency. Please be advised that this is a medical document. Medical documents are intended to carry relevant information, facts as evident, and the clinical opinion of the practitioner. The medical note is intended as peer to peer communication, and may appear blunt or direct. It is written in medical language, and may contain abbreviations or verbiage that are unfamiliar.        no

## 2025-02-10 NOTE — ED PROVIDER NOTE - HEAD, MLM
"Name: Joe Aviles      : 1942      MRN: [id number]  Encounter Provider: Sven Barajas DO  Encounter Date: 2/10/2025   Encounter department: [facility]  :  Assessment & Plan  Blood in stool    Orders:    Ambulatory Referral to Vascular Surgery    Carotid stenosis, asymptomatic, left    Orders:    CTA head and neck w wo contrast; Future    Claudication (HCC)  Today in the office Joe also reports symptoms suggestive of claudication.  He reported that recently he had to walk approximately 1 mile to go purchase \"cigarettes\" and needed to stop 3 times.  At this time recommend a lower extremity arterial duplex with return office visit discussed results.  We also discussed the benefits of complete smoking cessation and daily ambulation.      Orders:    VAS ARTERIAL DUPLEX- LOWER LIMB BILATERAL; Future    PVD (peripheral vascular disease) (HCC)         Dementia, unspecified dementia severity, unspecified dementia type, unspecified whether behavioral, psychotic, or mood disturbance or anxiety (HCC)         Cigarette nicotine dependence with nicotine-induced disorder         Asymptomatic stenosis of left carotid artery  Joe is a pleasant 82-year-old gentleman who presents to the office accompanied by his wife in follow-up from recent hospitalization where he was worked up and treated for a diverticular bleed.  Patient at that time had his Plavix held however he reports over the last couple days has resumed.  He has had no further episodes of GI bleeding.  At last office visit we did discuss that he has high-grade left carotid stenosis.  He was on the fence whether he was like to proceed with left carotid intervention.  Today in the office he reports that he is leaning towards intervention.  As such recommend a CTA of the head and neck with return office visit to discuss results and possible left transcarotid artery revascularization.  I was quite explicit and stating that this " procedure would not improve his overall cognition/history of dementia.  Will discuss further details of procedure including risk benefits and anticipated postoperative course at next office visit.  As we are planning on possible left carotid stenting he should continue on his aspirin, Plavix, and atorvastatin.  Certainly if he develops recurrent GI bleeds he may hold his antiplatelet therapy.             History of Present Illness   HPI  Joe Aviles is a 82 y.o. male who presents to the office accompanied by his wife in follow-up from recent hospitalization for GI bleed.    Pt here for f/u of Carotid Artery Disease. Pt c/o left side weakness and headaches. Pt denies eye site changes, slurred speech and facial drooping. Pt smokes 1/2 ppd.  History obtained from: patient and wife    Review of Systems   Constitutional: Negative.    HENT: Negative.     Eyes: Negative.    Respiratory:  Positive for shortness of breath.    Cardiovascular: Negative.    Gastrointestinal: Negative.    Endocrine: Negative.    Genitourinary: Negative.    Musculoskeletal: Negative.    Skin: Negative.    Allergic/Immunologic: Negative.    Neurological:  Positive for dizziness and headaches.   Hematological: Negative.    Psychiatric/Behavioral: Negative.       Past Medical History   Past Medical History:   Diagnosis Date    BPH (benign prostatic hyperplasia)     Colon polyp     Dementia (HCC)     Diverticulitis     Hypertension     Macular degeneration     Stroke (HCC)      Past Surgical History:   Procedure Laterality Date    COLONOSCOPY      CORONARY ANGIOPLASTY WITH STENT PLACEMENT      CT CYSTOGRAM  1/14/2022    CYSTOSCOPY      CT TCAT IV STENT CRV CRTD ART EMBOLIC PROTECJ Right 05/22/2024    Procedure: ANGIOPLASTY ARTERY CAROTID W/ STENT;  Surgeon: Sven Barajas DO;  Location: [facility];  Service: Vascular    PROSTATE SURGERY      TONSILLECTOMY      VASECTOMY       Family History   Problem Relation Age of Onset    Brain cancer  Mother     Cancer Brother         Bladder    Brain cancer Child       reports that he has been smoking cigarettes. He started smoking about 68 years ago. He has a 34.1 pack-year smoking history. He has never used smokeless tobacco. He reports that he does not currently use alcohol. He reports that he does not use drugs.  Current Outpatient Medications on File Prior to Visit   Medication Sig Dispense Refill    acetaminophen (TYLENOL) 325 mg tablet Take 2 tablets (650 mg total) by mouth every 6 (six) hours as needed for mild pain      amLODIPine (NORVASC) 5 mg tablet Take 5 mg by mouth daily      aspirin 81 mg chewable tablet Chew 1 tablet (81 mg total) daily 30 tablet 0    atorvastatin (LIPITOR) 40 mg tablet Take 1 tablet (40 mg total) by mouth every evening 30 tablet 3    clopidogrel (PLAVIX) 75 mg tablet Take 1 tablet (75 mg total) by mouth daily 90 tablet 2    donepezil (ARICEPT) 5 mg tablet Take 5 mg by mouth daily at bedtime      memantine (NAMENDA) 10 mg tablet Take 10 mg by mouth daily      Multiple Vitamins-Minerals (OCUVITE PO) Take 1 tablet by mouth daily      propranolol (INDERAL) 20 mg tablet       bisacodyl (DULCOLAX) 5 mg EC tablet Take 2 tablets (10 mg total) by mouth once for 1 dose 2 tablet 0    Multiple Vitamin (MULTIVITAMIN) capsule Take 1 capsule by mouth daily (Patient not taking: Reported on 8/13/2024)      nicotine (NICODERM CQ) 7 mg/24hr TD 24 hr patch Place 1 patch on the skin over 24 hours daily (Patient not taking: Reported on 8/13/2024) 28 patch 0    tamsulosin (FLOMAX) 0.4 mg Take 0.4 mg by mouth daily with dinner (Patient not taking: Reported on 8/13/2024)       No current facility-administered medications on file prior to visit.   No Known Allergies   Current Outpatient Medications on File Prior to Visit   Medication Sig Dispense Refill    acetaminophen (TYLENOL) 325 mg tablet Take 2 tablets (650 mg total) by mouth every 6 (six) hours as needed for mild pain      amLODIPine (NORVASC) 5  "mg tablet Take 5 mg by mouth daily      aspirin 81 mg chewable tablet Chew 1 tablet (81 mg total) daily 30 tablet 0    atorvastatin (LIPITOR) 40 mg tablet Take 1 tablet (40 mg total) by mouth every evening 30 tablet 3    clopidogrel (PLAVIX) 75 mg tablet Take 1 tablet (75 mg total) by mouth daily 90 tablet 2    donepezil (ARICEPT) 5 mg tablet Take 5 mg by mouth daily at bedtime      memantine (NAMENDA) 10 mg tablet Take 10 mg by mouth daily      Multiple Vitamins-Minerals (OCUVITE PO) Take 1 tablet by mouth daily      propranolol (INDERAL) 20 mg tablet       bisacodyl (DULCOLAX) 5 mg EC tablet Take 2 tablets (10 mg total) by mouth once for 1 dose 2 tablet 0    Multiple Vitamin (MULTIVITAMIN) capsule Take 1 capsule by mouth daily (Patient not taking: Reported on 8/13/2024)      nicotine (NICODERM CQ) 7 mg/24hr TD 24 hr patch Place 1 patch on the skin over 24 hours daily (Patient not taking: Reported on 8/13/2024) 28 patch 0    tamsulosin (FLOMAX) 0.4 mg Take 0.4 mg by mouth daily with dinner (Patient not taking: Reported on 8/13/2024)       No current facility-administered medications on file prior to visit.      Social History     Tobacco Use    Smoking status: Every Day     Current packs/day: 0.50     Average packs/day: 0.5 packs/day for 68.1 years (34.1 ttl pk-yrs)     Types: Cigarettes     Start date: 1957    Smokeless tobacco: Never   Vaping Use    Vaping status: Never Used   Substance and Sexual Activity    Alcohol use: Not Currently    Drug use: No    Sexual activity: Not on file        Objective   /82 (BP Location: Left arm, Patient Position: Sitting)   Pulse 75   Ht 5' 10\" (1.778 m)   Wt 72.1 kg (159 lb)   SpO2 96%   BMI 22.81 kg/m²      Physical Exam  Constitutional:       General: He is not in acute distress.     Appearance: Normal appearance. He is well-developed. He is not ill-appearing, toxic-appearing or diaphoretic.   HENT:      Head: Normocephalic and atraumatic.      Right Ear: External " ear normal.      Left Ear: External ear normal.   Eyes:      General: No scleral icterus.     Conjunctiva/sclera: Conjunctivae normal.   Neck:      Trachea: No tracheal deviation.   Cardiovascular:      Rate and Rhythm: Normal rate.   Pulmonary:      Effort: Pulmonary effort is normal.      Breath sounds: Normal breath sounds.   Abdominal:      Palpations: Abdomen is soft. Mass: no appreciable aortic pulsation/aneurysm.   Musculoskeletal:         General: Normal range of motion.      Cervical back: Normal range of motion and neck supple.   Skin:     General: Skin is warm and dry.   Neurological:      Mental Status: He is alert and oriented to person, place, and time.   Psychiatric:         Mood and Affect: Mood normal.         Behavior: Behavior normal.         Thought Content: Thought content normal.         Judgment: Judgment normal.         Administrative Statements   I have spent a total time of 30 minutes in caring for this patient on the day of the visit/encounter including Diagnostic results, Prognosis, Risks and benefits of tx options, Instructions for management, Patient and family education, Importance of tx compliance, Risk factor reductions, Impressions, Counseling / Coordination of care, Documenting in the medical record, Reviewing / ordering tests, medicine, procedures  , and Obtaining or reviewing history  .    Head is atraumatic. Head shape is symmetrical.